# Patient Record
Sex: FEMALE | Race: WHITE | NOT HISPANIC OR LATINO | URBAN - NONMETROPOLITAN AREA
[De-identification: names, ages, dates, MRNs, and addresses within clinical notes are randomized per-mention and may not be internally consistent; named-entity substitution may affect disease eponyms.]

---

## 2021-11-30 ENCOUNTER — IMPORTED ENCOUNTER (OUTPATIENT)
Dept: URBAN - NONMETROPOLITAN AREA CLINIC 1 | Facility: CLINIC | Age: 76
End: 2021-11-30

## 2021-11-30 PROBLEM — Z96.1: Noted: 2021-11-30

## 2021-11-30 PROBLEM — H26.493: Noted: 2021-11-30

## 2021-11-30 PROCEDURE — 92004 COMPRE OPH EXAM NEW PT 1/>: CPT

## 2021-11-30 PROCEDURE — 92015 DETERMINE REFRACTIVE STATE: CPT

## 2021-11-30 NOTE — PATIENT DISCUSSION
s/p PCIOL-Stable PCIOL OU.-Monitor for PCO. PCO-Explained PCO and RBAs of YAG Capsulotomy to pt. -Pt elects to proceed. refer for consult with dr. Underwood Standing; 's Notes: Has two grandauhters playing Bball and ice hockey. Sees PA in [facility]' office

## 2021-12-09 ENCOUNTER — IMPORTED ENCOUNTER (OUTPATIENT)
Dept: URBAN - NONMETROPOLITAN AREA CLINIC 1 | Facility: CLINIC | Age: 76
End: 2021-12-09

## 2021-12-09 PROBLEM — Z96.1: Noted: 2021-12-09

## 2021-12-09 PROBLEM — H26.491: Noted: 2021-12-09

## 2021-12-09 PROBLEM — H26.492: Noted: 2021-12-20

## 2021-12-09 PROCEDURE — 92014 COMPRE OPH EXAM EST PT 1/>: CPT

## 2021-12-09 PROCEDURE — 66821 AFTER CATARACT LASER SURGERY: CPT

## 2021-12-09 NOTE — PATIENT DISCUSSION
PCO-Explained PCO and RBAs of YAG Capsulotomy to pt. -Pt elects to proceed. YAG Caps OD today and YAG Caps OS in 1-2 weeks.; 's Notes: Has two grandauhters playing Bball and ice hockey. Sees PA in [facility]' office

## 2021-12-20 ENCOUNTER — IMPORTED ENCOUNTER (OUTPATIENT)
Dept: URBAN - NONMETROPOLITAN AREA CLINIC 1 | Facility: CLINIC | Age: 76
End: 2021-12-20

## 2021-12-20 ENCOUNTER — PREPPED CHART (OUTPATIENT)
Dept: RURAL CLINIC 1 | Facility: CLINIC | Age: 76
End: 2021-12-20

## 2021-12-20 PROCEDURE — 66821 AFTER CATARACT LASER SURGERY: CPT

## 2021-12-20 NOTE — PATIENT DISCUSSION
PCO-Explained PCO and RBAs of YAG Capsulotomy to pt. -Pt elects to proceed. YAG Caps OS today 1 month POV; 's Notes: Has two grandauhters playing Bball and ice hockey. Sees PA in [facility]' office

## 2022-01-18 ASSESSMENT — TONOMETRY
OD_IOP_MMHG: 16
OS_IOP_MMHG: 16

## 2022-01-18 ASSESSMENT — VISUAL ACUITY
OS_BAT: 20/30
OS_CC: 20/25-2
OD_CC: 20/25

## 2022-01-24 ENCOUNTER — POST-OP (OUTPATIENT)
Dept: RURAL CLINIC 1 | Facility: CLINIC | Age: 77
End: 2022-01-24

## 2022-01-24 DIAGNOSIS — Z96.1: ICD-10-CM

## 2022-01-24 PROCEDURE — 99024 POSTOP FOLLOW-UP VISIT: CPT

## 2022-01-24 ASSESSMENT — TONOMETRY
OS_IOP_MMHG: 16
OD_IOP_MMHG: 16

## 2022-01-24 ASSESSMENT — VISUAL ACUITY
OS_SC: 20/60-2
OD_SC: 20/30-1
OS_SC: 20/30-1
OU_SC: 20/30-1
OU_SC: 20/30-1
OD_SC: 20/30-1

## 2022-04-09 ASSESSMENT — TONOMETRY
OS_IOP_MMHG: 19
OD_IOP_MMHG: 17
OS_IOP_MMHG: 16
OS_IOP_MMHG: 17
OD_IOP_MMHG: 17
OD_IOP_MMHG: 16

## 2022-04-09 ASSESSMENT — VISUAL ACUITY
OS_SC: 20/25-2
OD_SC: 20/25
OD_SC: 20/30
OU_SC: 20/30
OS_GLARE: 20/30
OD_GLARE: 20/30
OS_GLARE: 20/30
OS_SC: 20/25
OS_SC: 20/30
OD_SC: 20/25-1
OD_SC: 20/25
OU_SC: 20/20
OS_SC: 20/30

## 2024-02-28 ENCOUNTER — POST-OP (OUTPATIENT)
Dept: RURAL CLINIC 1 | Facility: CLINIC | Age: 79
End: 2024-02-28

## 2024-02-28 DIAGNOSIS — Z96.1: ICD-10-CM

## 2024-02-28 DIAGNOSIS — H16.223: ICD-10-CM

## 2024-02-28 DIAGNOSIS — H43.813: ICD-10-CM

## 2024-02-28 PROCEDURE — 92014 COMPRE OPH EXAM EST PT 1/>: CPT

## 2024-02-28 ASSESSMENT — VISUAL ACUITY
OD_SC: 20/30-1
OS_SC: 20/40-1

## 2024-02-28 ASSESSMENT — TONOMETRY
OD_IOP_MMHG: 14
OS_IOP_MMHG: 14